# Patient Record
Sex: FEMALE | Race: WHITE | Employment: FULL TIME | URBAN - METROPOLITAN AREA
[De-identification: names, ages, dates, MRNs, and addresses within clinical notes are randomized per-mention and may not be internally consistent; named-entity substitution may affect disease eponyms.]

---

## 2018-09-21 ENCOUNTER — OFFICE VISIT (OUTPATIENT)
Dept: FAMILY MEDICINE CLINIC | Age: 48
End: 2018-09-21

## 2018-09-21 VITALS
RESPIRATION RATE: 16 BRPM | HEART RATE: 65 BPM | WEIGHT: 130.6 LBS | SYSTOLIC BLOOD PRESSURE: 95 MMHG | DIASTOLIC BLOOD PRESSURE: 69 MMHG | BODY MASS INDEX: 24.03 KG/M2 | HEIGHT: 62 IN | TEMPERATURE: 98 F | OXYGEN SATURATION: 98 %

## 2018-09-21 DIAGNOSIS — R01.1 HEART MURMUR: ICD-10-CM

## 2018-09-21 DIAGNOSIS — R31.29 MICROSCOPIC HEMATURIA: ICD-10-CM

## 2018-09-21 DIAGNOSIS — A69.20 LYME DISEASE: Primary | ICD-10-CM

## 2018-09-21 RX ORDER — ASCORBIC ACID 500 MG
TABLET ORAL
COMMUNITY

## 2018-09-21 RX ORDER — DOXYCYCLINE 100 MG/1
100 TABLET ORAL 2 TIMES DAILY
COMMUNITY
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
## 2018-09-21 NOTE — MR AVS SNAPSHOT
[address] 
[phone] Patient: Justyna Mace MRN: [id number] BYB:[id number] Visit Information Date & Time Provider Department Dept. Phone Encounter #  
 9/21/2018  2:00 PM Vahid Rolle, [facility] [phone] [id number] Follow-up Instructions Return if symptoms worsen or fail to improve. Upcoming Health Maintenance Date Due DTaP/Tdap/Td series (1 - Tdap) 4/11/1992 PAP AKA CERVICAL CYTOLOGY 4/11/1992 Influenza Age 5 to Adult 4/21/2019* *Topic was postponed. The date shown is not the original due date. Allergies as of 9/21/2018  Review Complete On: 9/21/2018 By: Vahid Rolle MD  
 Not on File Current Immunizations  Reviewed on 9/20/2018 No immunizations on file. Not reviewed this visit You Were Diagnosed With   
  
 Codes Comments Lyme disease    -  Primary ICD-10-CM: A69.20 ICD-9-CM: 088.81 Microscopic hematuria     ICD-10-CM: R31.29 ICD-9-CM: 599.72 Heart murmur     ICD-10-CM: R01.1 ICD-9-CM: 302. 2 Vitals BP Pulse Temp Resp Height(growth percentile) Weight(growth percentile) 95/69 (BP 1 Location: Right arm, BP Patient Position: Sitting) 65 98 °F (36.7 °C) (Oral) 16 5' 2\" (1.575 m) 130 lb 9.6 oz (59.2 kg) LMP SpO2 BMI Smoking Status 08/17/2018 (Approximate) 98% 23.89 kg/m2 Never Smoker Vitals History BMI and BSA Data Body Mass Index Body Surface Area  
 23.89 kg/m 2 1.61 m 2 Preferred Pharmacy Pharmacy Name Phone [facility] [address] [facility], [address] [facility] AT [address] [place] [address] [phone] Your Updated Medication List  
  
   
This list is accurate as of 9/21/18  2:54 PM.  Always use your most recent med list.  
  
  
  
  
 ACYCLOVIR PO Take 500 mg by mouth daily. doxycycline 100 mg tablet Commonly known as:  ADOXA Take 100 mg by mouth two (2) times a day. VITAMIN C 500 mg tablet Generic drug:  ascorbic acid (vitamin C) Take  by mouth. We Performed the Following URINALYSIS W/ RFLX MICROSCOPIC [81145 CPT(R)] Follow-up Instructions Return if symptoms worsen or fail to improve. To-Do List   
 09/21/2018 ECHO:  ECHO DOPPLER COMPLETE Patient Instructions Lyme Disease: Care Instructions Your Care Instructions Lyme disease is a bacterial infection spread by ticks. Antibiotics can treat Lyme disease. If you do not treat Lyme disease, it can lead to problems with your skin, joints, heart, and nervous system. These problems can develop weeks, months, or even years after you get the infection. Your doctor may prescribe antibiotics even if it is not yet certain that you have Lyme disease. Follow-up care is a key part of your treatment and safety. Be sure to make and go to all appointments, and call your doctor if you are having problems. It's also a good idea to know your test results and keep a list of the medicines you take. How can you care for yourself at home? · Take your antibiotics as directed. Do not stop taking them just because you feel better. You need to take the full course of antibiotics. · Take an over-the-counter pain medicine if needed, such as acetaminophen (Tylenol), ibuprofen (Advil, Motrin), or naproxen (Aleve). Read and follow all instructions on the label. To prevent Lyme disease in the future · Avoid ticks: 
¨ Learn where ticks are found in your community, and stay away from those areas if possible. ¨ Cover as much of your body as possible when you work or play in grassy or wooded areas. ¨ Use insect repellents, such as products containing DEET. You can spray them on your skin.  
¨ Take steps to control ticks on your property if you live in an area where Lyme disease occurs. Clear leaves, brush, tall grasses, woodpiles, and stone fences from around your house and the edges of your yard or garden. This may help get rid of ticks. · When you come in from outdoors, check your body for ticks, including your groin, head, and underarms. The ticks may be about the size of a poppy seed. If no one else can help you check for ticks on your scalp, comb your hair with a fine-tooth comb. · If you find a tick, remove it quickly. If you can't remove it with your fingers, use tweezers to grasp the tick as close to its mouth (the part in your skin) as possible. Slowly pull the tick straight out-do not twist or yank-until its mouth releases from your skin. If part of the tick stays in the skin, leave it alone. It will likely come out on its own in a few days. · Ticks can come into your house on clothing, outdoor gear, and pets. These ticks can fall off and attach to you. ¨ Check your clothing and outdoor gear. Remove any ticks you find. Then put your clothing in a clothes dryer on high heat for 1 hour to kill any ticks that might remain. ¨ Check your pets for ticks after they have been outdoors. · When hiking in the woods, carry a small dry jar or ziplock bag. If you find a tick on your body, remove the tick and put it in the jar or bag. Store the container in the freezer so you can give it to your doctor if symptoms develop. The tick can be tested to learn whether it is carrying the bacteria that cause Lyme disease. When should you call for help? Call your doctor now or seek immediate medical care if: 
  · You are confused or cannot think clearly.  
  · You have a headache or stiff neck.  
  · You have a new or worse rash.  
  · You have symptoms of infection, such as: 
¨ Increased pain, swelling, warmth, or redness. ¨ Red streaks leading from the area. ¨ Pus draining from the area.  
¨ A fever.  
 Watch closely for changes in your health, and be sure to contact your doctor if: 
  · You have new or worse weakness or muscle pain.  
  · You have new joint pain.  
  · You do not get better as expected. Where can you learn more? Go to http://dave-destinee.info/. Enter Y599 in the search box to learn more about \"Lyme Disease: Care Instructions. \" Current as of: November 18, 2017 Content Version: 11.7 © 0320-3282 [employer]. Care instructions adapted under license by [employer] (which disclaims liability or warranty for this information). If you have questions about a medical condition or this instruction, always ask your healthcare professional. [place] [address] any warranty or liability for your use of this information. Introducing [facility]! [employer] introduces [employer] patient portal. Now you can access parts of your medical record, email your doctor's office, and request medication refills online. 1. In your internet browser, go to [email or web address] 2. Click on the First Time User? Click Here link in the Sign In box. You will see the New Member Sign Up page. 3. Enter your [employer] Access Code exactly as it appears below. You will not need to use this code after youve completed the sign-up process. If you do not sign up before the expiration date, you must request a new code. · [employer] Access Code: [email or web address] Expires: 12/20/2018  1:59 PM 
 
4. Enter the last four digits of your Social Security Number (xxxx) and Date of Birth (mm/dd/yyyy) as indicated and click Submit. You will be taken to the next sign-up page. 5. Create a [employer] ID. This will be your [employer] login ID and cannot be changed, so think of one that is secure and easy to remember. 6. Create a [employer] password. You can change your password at any time. 7. Enter your Password Reset Question and Answer. This can be used at a later time if you forget your password. 8. Enter your e-mail address. You will receive e-mail notification when new information is available in [address] 9. Click Sign Up. You can now view and download portions of your medical record. 10. Click the Download Summary menu link to download a portable copy of your medical information. If you have questions, please visit the Frequently Asked Questions section of the [employer] website. Remember, [employer] is NOT to be used for urgent needs. For medical emergencies, dial 911. Now available from your iPhone and Android! Please provide this summary of care documentation to your next provider. Your primary care clinician is listed as Kaye Kwok. If you have any questions after today's visit, please call [phone].

## 2018-09-21 NOTE — PATIENT INSTRUCTIONS
Lyme Disease: Care Instructions Your Care Instructions Lyme disease is a bacterial infection spread by ticks. Antibiotics can treat Lyme disease. If you do not treat Lyme disease, it can lead to problems with your skin, joints, heart, and nervous system. These problems can develop weeks, months, or even years after you get the infection. Your doctor may prescribe antibiotics even if it is not yet certain that you have Lyme disease. Follow-up care is a key part of your treatment and safety. Be sure to make and go to all appointments, and call your doctor if you are having problems. It's also a good idea to know your test results and keep a list of the medicines you take. How can you care for yourself at home? · Take your antibiotics as directed. Do not stop taking them just because you feel better. You need to take the full course of antibiotics. · Take an over-the-counter pain medicine if needed, such as acetaminophen (Tylenol), ibuprofen (Advil, Motrin), or naproxen (Aleve). Read and follow all instructions on the label. To prevent Lyme disease in the future · Avoid ticks: 
¨ Learn where ticks are found in your community, and stay away from those areas if possible. ¨ Cover as much of your body as possible when you work or play in grassy or wooded areas. ¨ Use insect repellents, such as products containing DEET. You can spray them on your skin. ¨ Take steps to control ticks on your property if you live in an area where Lyme disease occurs. Clear leaves, brush, tall grasses, woodpiles, and stone fences from around your house and the edges of your yard or garden. This may help get rid of ticks. · When you come in from outdoors, check your body for ticks, including your groin, head, and underarms. The ticks may be about the size of a poppy seed. If no one else can help you check for ticks on your scalp, comb your hair with a fine-tooth comb. · If you find a tick, remove it quickly. If you can't remove it with your fingers, use tweezers to grasp the tick as close to its mouth (the part in your skin) as possible. Slowly pull the tick straight out-do not twist or yank-until its mouth releases from your skin. If part of the tick stays in the skin, leave it alone. It will likely come out on its own in a few days. · Ticks can come into your house on clothing, outdoor gear, and pets. These ticks can fall off and attach to you. ¨ Check your clothing and outdoor gear. Remove any ticks you find. Then put your clothing in a clothes dryer on high heat for 1 hour to kill any ticks that might remain. ¨ Check your pets for ticks after they have been outdoors. · When hiking in the woods, carry a small dry jar or ziplock bag. If you find a tick on your body, remove the tick and put it in the jar or bag. Store the container in the freezer so you can give it to your doctor if symptoms develop. The tick can be tested to learn whether it is carrying the bacteria that cause Lyme disease. When should you call for help? Call your doctor now or seek immediate medical care if: 
  · You are confused or cannot think clearly.  
  · You have a headache or stiff neck.  
  · You have a new or worse rash.  
  · You have symptoms of infection, such as: 
¨ Increased pain, swelling, warmth, or redness. ¨ Red streaks leading from the area. ¨ Pus draining from the area. ¨ A fever.  
 Watch closely for changes in your health, and be sure to contact your doctor if: 
  · You have new or worse weakness or muscle pain.  
  · You have new joint pain.  
  · You do not get better as expected. Where can you learn more? Go to http://dave-destinee.info/. Enter Y599 in the search box to learn more about \"Lyme Disease: Care Instructions. \" Current as of: November 18, 2017 Content Version: 11.7 © 3064-7335 "[employer].  Care instructions adapted under license by [address] (which disclaims liability or warranty for this information). If you have questions about a medical condition or this instruction, always ask your healthcare professional. [address] any warranty or liability for your use of this information.

## 2018-09-21 NOTE — PROGRESS NOTES
Chief Complaint Patient presents with  New Patient  
  to est. care. no pcp in awhile , just went to urgent care. Just dx with Lymes dz last week, on antibiotic Reviewed Record in preparation for visit and have obtained necessary documentation. Identified pt with two pt identifiers (Name @ ) Health Maintenance Due Topic  DTaP/Tdap/Td series (1 - Tdap)  PAP AKA CERVICAL CYTOLOGY  Influenza Age 5 to Adult 1. Have you been to the ER, urgent care clinic since your last visit? Hospitalized since your last visit? Went to [facility] in early September , fever, lighthedness, dx with Lyme dz. 2. Have you seen or consulted any other health care providers outside of the [address] [place] [address] since your last visit? Include any pap smears or colon screening.  No

## 2018-09-22 LAB
APPEARANCE UR: CLEAR
BILIRUB UR QL STRIP: NEGATIVE
COLOR UR: YELLOW
GLUCOSE UR QL: NEGATIVE
HGB UR QL STRIP: NEGATIVE
KETONES UR QL STRIP: ABNORMAL
LEUKOCYTE ESTERASE UR QL STRIP: NEGATIVE
MICRO URNS: ABNORMAL
NITRITE UR QL STRIP: NEGATIVE
PH UR STRIP: 5.5 [PH] (ref 5–7.5)
PROT UR QL STRIP: NEGATIVE
SP GR UR: 1.02 (ref 1–1.03)
UROBILINOGEN UR STRIP-MCNC: 0.2 MG/DL (ref 0.2–1)

## 2018-09-27 ENCOUNTER — PATIENT MESSAGE (OUTPATIENT)
Dept: FAMILY MEDICINE CLINIC | Age: 48
End: 2018-09-27

## 2018-09-28 NOTE — TELEPHONE ENCOUNTER
From: Pattricia Mcburney  To: Emy Gamble MD  Sent: 9/27/2018 10:38 PM EDT  Subject: Visit Angelique Mcrae,  I have a rash on chest, hips, abdomen, and legs. I do believe this is Lymes related. With a week left on the antibiotic, I do not feel like its going to be enough time to wipe it out and Im very concerned. I think I need to come back in. Also, have you had a chance to look at the blood work? It looks like there was no blood in my urine on the last test. Should I cancel the CT scan?   Thanks,  Rosita Palmer

## 2018-10-11 ENCOUNTER — OFFICE VISIT (OUTPATIENT)
Dept: FAMILY MEDICINE CLINIC | Age: 48
End: 2018-10-11

## 2018-10-11 VITALS
DIASTOLIC BLOOD PRESSURE: 60 MMHG | OXYGEN SATURATION: 98 % | BODY MASS INDEX: 24.11 KG/M2 | HEART RATE: 67 BPM | SYSTOLIC BLOOD PRESSURE: 104 MMHG | WEIGHT: 131 LBS | HEIGHT: 62 IN | RESPIRATION RATE: 16 BRPM | TEMPERATURE: 98.4 F

## 2018-10-11 DIAGNOSIS — Z11.4 SCREENING FOR HIV (HUMAN IMMUNODEFICIENCY VIRUS): ICD-10-CM

## 2018-10-11 DIAGNOSIS — Z13.220 LIPID SCREENING: ICD-10-CM

## 2018-10-11 DIAGNOSIS — R53.83 FATIGUE, UNSPECIFIED TYPE: Primary | ICD-10-CM

## 2018-10-11 DIAGNOSIS — A69.20 LYME DISEASE: ICD-10-CM

## 2018-10-11 DIAGNOSIS — R42 LIGHTHEADEDNESS: ICD-10-CM

## 2018-10-11 NOTE — PROGRESS NOTES
[facility]  [place], [address]  [phone]             Date of visit: 10/11/2018   Subjective:      History obtained from:  the patient. Nicolas Castro is a 53 y.o. female who presents today for f/u Lyme  Had some numbness for just a second right side of neck  Fatigue still there but better than it was  Has been off abx for 5 days now,   The pounding chest feeling is gone  The rash comes and goes, mostly at night, itches, valerie when she takes a shower  When it first started was everywhere there was heat like around bra strap, groin, and legs, mostly lower legs  Coincided when she started CBD oil  She is still taking that and not as bad as it was  Still light headed but not as often    Not exercising as much as she was. When she does exercises that is when she feels tired, weak  The lightheadedness is not connected to the exercise. Patient Active Problem List    Diagnosis Date Noted    Heart murmur 09/21/2018    Lyme disease 09/21/2018    Microscopic hematuria 09/21/2018     Current Outpatient Prescriptions   Medication Sig Dispense Refill    doxycycline (ADOXA) 100 mg tablet Take 100 mg by mouth two (2) times a day.  ACYCLOVIR PO Take 500 mg by mouth daily.  ascorbic acid, vitamin C, (VITAMIN C) 500 mg tablet Take  by mouth.  valACYclovir (VALTREX) 500 mg tablet Take 500 mg by mouth two (2) times a day. No Known Allergies  Past Medical History:   Diagnosis Date    Anemia     Lyme disease      No past surgical history on file.   Family History   Problem Relation Age of Onset    No Known Problems Mother     Cancer Father      colon    No Known Problems Sister      Social History   Substance Use Topics    Smoking status: Never Smoker    Smokeless tobacco: Never Used    Alcohol use No      Comment: social      Social History     Social History Narrative    Enjoys biking for exercise        Review of Systems  Gen: denies fever, appetite not perfect but better  CV denies chest pain        Objective:     Vitals:    10/11/18 0806   BP: 104/60   Pulse: 67   Resp: 16   Temp: 98.4 °F (36.9 °C)   TempSrc: Oral   SpO2: 98%   Weight: 131 lb (59.4 kg)   Height: 5' 2\" (1.575 m)     Body mass index is 23.96 kg/(m^2). General: stated age, well developed, well nourished and in NAD  Neck: supple, symmetrical, trachea midline, no adenopathy and thyroid: not enlarged, symmetric, no tenderness/mass/nodules  Lungs:  clear to auscultation w/o rales, rhonchi, wheezes w/normal effort and no use of accessory muscles of respiration   Heart: regular rate and rhythm, S1, S2 normal, 2/6 systolic murmur, no click, rub or gallop  Ext:  No edema noted. Lymph: no cervical adenopathy appreciated  Skin:  Erythematous 1-2mm papules on lower legs, scaterred  Psych: alert and oriented to person, place, time and situation and Speech: appropriate quality, quantity and organization of sentences     EKG normal sinus rhythm    Assessment/Plan:       ICD-10-CM ICD-9-CM    1. Fatigue, unspecified type R53.83 780.79 CBC WITH AUTOMATED DIFF      METABOLIC PANEL, COMPREHENSIVE      TSH 3RD GENERATION      T4, FREE      VITAMIN D, 25 HYDROXY   2. Lyme disease A69.20 088.81    3. Lightheadedness R42 780.4 AMB POC EKG ROUTINE W/ 12 LEADS, INTER & REP   4.  Lipid screening Z13.220 V77.91 LIPID PANEL   5. Screening for HIV (human immunodeficiency virus) Z11.4 V73.89 HIV 1/2 AG/AB, 4TH GENERATION,W RFLX CONFIRM        Orders Placed This Encounter    CBC WITH AUTOMATED DIFF    METABOLIC PANEL, COMPREHENSIVE    TSH 3RD GENERATION    T4, FREE    HIV 1/2 AG/AB, 4TH GENERATION,W RFLX CONFIRM    VITAMIN D, 25 HYDROXY    LIPID PANEL    AMB POC EKG ROUTINE W/ 12 LEADS, INTER & REP       Fatigue probably still due to recovering from Lyme  Explained longer course abx no helpful  May take weeks to months to recover although likely she will feel back to normal soon  All symptoms getting less severe, less frequent  Will do labs as above to rule out other complications of lyme or causes of fatigue  EKG due to ongoing lightheadedness, but it was normal today  Echo tomorrow for the murmur, which is not new for her anyway  Reviewed worrisome signs or symptoms for which to call. Discussed the diagnosis and plan and she expressed understanding. Follow-up Disposition:  Return if symptoms worsen or fail to improve.     Jack Cheney MD

## 2018-10-11 NOTE — PATIENT INSTRUCTIONS
Fatigue: Care Instructions  Your Care Instructions    Fatigue is a feeling of tiredness, exhaustion, or lack of energy. You may feel fatigue because of too much or not enough activity. It can also come from stress, lack of sleep, boredom, and poor diet. Many medical problems, such as viral infections, can cause fatigue. Emotional problems, especially depression, are often the cause of fatigue. Fatigue is most often a symptom of another problem. Treatment for fatigue depends on the cause. For example, if you have fatigue because you have a certain health problem, treating this problem also treats your fatigue. If depression or anxiety is the cause, treatment may help. Follow-up care is a key part of your treatment and safety. Be sure to make and go to all appointments, and call your doctor if you are having problems. It's also a good idea to know your test results and keep a list of the medicines you take. How can you care for yourself at home? · Get regular exercise. But don't overdo it. Go back and forth between rest and exercise. · Get plenty of rest.  · Eat a healthy diet. Do not skip meals, especially breakfast.  · Reduce your use of caffeine, tobacco, and alcohol. Caffeine is most often found in coffee, tea, cola drinks, and chocolate. · Limit medicines that can cause fatigue. This includes tranquilizers and cold and allergy medicines. When should you call for help? Watch closely for changes in your health, and be sure to contact your doctor if:    · You have new symptoms such as fever or a rash.     · Your fatigue gets worse.     · You have been feeling down, depressed, or hopeless. Or you may have lost interest in things that you usually enjoy.     · You are not getting better as expected. Where can you learn more? Go to http://dave-destinee.info/. Enter Z494 in the search box to learn more about \"Fatigue: Care Instructions. \"  Current as of: November 20, 2017  Content Version: 11.8  © 5303-1415 [employer]. Care instructions adapted under license by [employer] (which disclaims liability or warranty for this information). If you have questions about a medical condition or this instruction, always ask your healthcare professional. [address] any warranty or liability for your use of this information.

## 2018-10-12 ENCOUNTER — HOSPITAL ENCOUNTER (OUTPATIENT)
Dept: NON INVASIVE DIAGNOSTICS | Age: 48
Discharge: HOME | End: 2018-10-12
Attending: FAMILY MEDICINE
Payer: COMMERCIAL

## 2018-10-12 DIAGNOSIS — R01.1 HEART MURMUR: ICD-10-CM

## 2018-10-12 PROBLEM — E55.9 VITAMIN D INSUFFICIENCY: Status: ACTIVE | Noted: 2018-10-12

## 2018-10-12 LAB
25(OH)D3+25(OH)D2 SERPL-MCNC: 21.4 NG/ML (ref 30–100)
ALBUMIN SERPL-MCNC: 4.3 G/DL (ref 3.5–5.5)
ALBUMIN/GLOB SERPL: 1.8 {RATIO} (ref 1.2–2.2)
ALP SERPL-CCNC: 53 IU/L (ref 39–117)
ALT SERPL-CCNC: 11 IU/L (ref 0–32)
AST SERPL-CCNC: 17 IU/L (ref 0–40)
BASOPHILS # BLD AUTO: 0 X10E3/UL (ref 0–0.2)
BASOPHILS NFR BLD AUTO: 1 %
BILIRUB SERPL-MCNC: 0.3 MG/DL (ref 0–1.2)
BUN SERPL-MCNC: 12 MG/DL (ref 6–24)
BUN/CREAT SERPL: 21 (ref 9–23)
CALCIUM SERPL-MCNC: 8.8 MG/DL (ref 8.7–10.2)
CHLORIDE SERPL-SCNC: 103 MMOL/L (ref 96–106)
CHOLEST SERPL-MCNC: 160 MG/DL (ref 100–199)
CO2 SERPL-SCNC: 24 MMOL/L (ref 20–29)
CREAT SERPL-MCNC: 0.56 MG/DL (ref 0.57–1)
EOSINOPHIL # BLD AUTO: 0.1 X10E3/UL (ref 0–0.4)
EOSINOPHIL NFR BLD AUTO: 3 %
ERYTHROCYTE [DISTWIDTH] IN BLOOD BY AUTOMATED COUNT: 13.7 % (ref 12.3–15.4)
GLOBULIN SER CALC-MCNC: 2.4 G/DL (ref 1.5–4.5)
GLUCOSE SERPL-MCNC: 90 MG/DL (ref 65–99)
HCT VFR BLD AUTO: 35 % (ref 34–46.6)
HDLC SERPL-MCNC: 58 MG/DL
HGB BLD-MCNC: 11.6 G/DL (ref 11.1–15.9)
HIV 1+2 AB+HIV1 P24 AG SERPL QL IA: NON REACTIVE
IMM GRANULOCYTES # BLD: 0 X10E3/UL (ref 0–0.1)
IMM GRANULOCYTES NFR BLD: 0 %
INTERPRETATION, 910389: NORMAL
LDLC SERPL CALC-MCNC: 93 MG/DL (ref 0–99)
LYMPHOCYTES # BLD AUTO: 1.5 X10E3/UL (ref 0.7–3.1)
LYMPHOCYTES NFR BLD AUTO: 39 %
MCH RBC QN AUTO: 30.5 PG (ref 26.6–33)
MCHC RBC AUTO-ENTMCNC: 33.1 G/DL (ref 31.5–35.7)
MCV RBC AUTO: 92 FL (ref 79–97)
MONOCYTES # BLD AUTO: 0.2 X10E3/UL (ref 0.1–0.9)
MONOCYTES NFR BLD AUTO: 6 %
NEUTROPHILS # BLD AUTO: 2 X10E3/UL (ref 1.4–7)
NEUTROPHILS NFR BLD AUTO: 51 %
PLATELET # BLD AUTO: 201 X10E3/UL (ref 150–379)
POTASSIUM SERPL-SCNC: 4.2 MMOL/L (ref 3.5–5.2)
PROT SERPL-MCNC: 6.7 G/DL (ref 6–8.5)
RBC # BLD AUTO: 3.8 X10E6/UL (ref 3.77–5.28)
SODIUM SERPL-SCNC: 139 MMOL/L (ref 134–144)
T4 FREE SERPL-MCNC: 1.05 NG/DL (ref 0.82–1.77)
TRIGL SERPL-MCNC: 45 MG/DL (ref 0–149)
TSH SERPL DL<=0.005 MIU/L-ACNC: 2.12 UIU/ML (ref 0.45–4.5)
VLDLC SERPL CALC-MCNC: 9 MG/DL (ref 5–40)
WBC # BLD AUTO: 3.8 X10E3/UL (ref 3.4–10.8)

## 2018-10-12 PROCEDURE — 93306 TTE W/DOPPLER COMPLETE: CPT

## 2018-10-12 PROCEDURE — 93320 DOPPLER ECHO COMPLETE: CPT

## 2018-10-12 RX ORDER — GLUCOSAMINE SULFATE 1500 MG
1000 POWDER IN PACKET (EA) ORAL DAILY
COMMUNITY

## 2018-11-20 ENCOUNTER — TELEPHONE (OUTPATIENT)
Dept: FAMILY MEDICINE CLINIC | Age: 48
End: 2018-11-20

## 2018-11-20 NOTE — TELEPHONE ENCOUNTER
----- Message from Veatrice Chain sent at 11/20/2018  9:09 AM EST -----  Regarding: Dr. Wilmer Wahl telephone   Pt is requesting a call back in regards to recent diagnosis of lime diease. States that she is having anxiety  from it, no availability for Dr. Татьяна Kelley on today.  [facility] [phone]    LOV: 10/11/18

## 2018-11-20 NOTE — TELEPHONE ENCOUNTER
Called pt and she states that she wanted to come in she is still feeling anxious about the lyme disease dx. She states that she had wanted to get another round of ABX from us but was told she didn't need another round. She reached out to a \"lyme literate\" MD.  She has been seeing Dr Giorgi Taylor. She is still having sx's the fatigue no energy. She is feeling a little anxious bout it. She has also called Dr Hattie Guillory office and she is waiting for a call now. I offered to schedule her for Sat and she states that she won't be in town. Asked if I could schedule her for next week and she said that she didn't know her schedule for next week and would call back. She states that she will wait to hear from Dr Luciano Yang and see what she recommends.